# Patient Record
Sex: FEMALE | Race: WHITE | URBAN - METROPOLITAN AREA
[De-identification: names, ages, dates, MRNs, and addresses within clinical notes are randomized per-mention and may not be internally consistent; named-entity substitution may affect disease eponyms.]

---

## 2020-03-20 ENCOUNTER — APPOINTMENT (RX ONLY)
Dept: URBAN - METROPOLITAN AREA CLINIC 117 | Facility: CLINIC | Age: 70
Setting detail: DERMATOLOGY
End: 2020-03-20

## 2020-03-20 DIAGNOSIS — C50 MALIGNANT NEOPLASM OF BREAST: ICD-10-CM

## 2020-03-20 PROBLEM — C50.919 MALIGNANT NEOPLASM OF UNSPECIFIED SITE OF UNSPECIFIED FEMALE BREAST: Status: ACTIVE | Noted: 2020-03-20

## 2020-03-20 PROCEDURE — 99204 OFFICE O/P NEW MOD 45 MIN: CPT

## 2020-03-20 PROCEDURE — ? COUNSELING -  BREAST RECONSTRUCTION

## 2020-03-20 PROCEDURE — ? REFERRAL CORRESPONDENCE

## 2020-03-20 NOTE — HPI: BREAST RECONSTRUCTION CONSULTATION
Which Side(S)?: the left breast
Is This A New Presentation, Or A Follow-Up?: Breast Reconstruction Consultation
Who Referred You?: Dr. Fish Thomason, [place]
When Was The Diagnosis Confirmed?: 01/31/20